# Patient Record
Sex: FEMALE | Race: WHITE | Employment: FULL TIME | URBAN - METROPOLITAN AREA
[De-identification: names, ages, dates, MRNs, and addresses within clinical notes are randomized per-mention and may not be internally consistent; named-entity substitution may affect disease eponyms.]

---

## 2018-11-14 ENCOUNTER — OFFICE VISIT (OUTPATIENT)
Dept: FAMILY MEDICINE | Facility: CLINIC | Age: 48
End: 2018-11-14
Payer: COMMERCIAL

## 2018-11-14 VITALS — HEART RATE: 80 BPM | DIASTOLIC BLOOD PRESSURE: 80 MMHG | SYSTOLIC BLOOD PRESSURE: 138 MMHG | OXYGEN SATURATION: 96 %

## 2018-11-14 DIAGNOSIS — L70.0 ACNE VULGARIS: Primary | ICD-10-CM

## 2018-11-14 DIAGNOSIS — D23.72 DERMATOFIBROMA OF LEFT LOWER LEG: ICD-10-CM

## 2018-11-14 DIAGNOSIS — L91.0 KELOID SCAR: ICD-10-CM

## 2018-11-14 PROCEDURE — 82565 ASSAY OF CREATININE: CPT | Performed by: FAMILY MEDICINE

## 2018-11-14 PROCEDURE — 84132 ASSAY OF SERUM POTASSIUM: CPT | Performed by: FAMILY MEDICINE

## 2018-11-14 PROCEDURE — 36415 COLL VENOUS BLD VENIPUNCTURE: CPT | Performed by: FAMILY MEDICINE

## 2018-11-14 PROCEDURE — 99214 OFFICE O/P EST MOD 30 MIN: CPT | Performed by: FAMILY MEDICINE

## 2018-11-14 RX ORDER — SPIRONOLACTONE 50 MG/1
50 TABLET, FILM COATED ORAL DAILY
Qty: 90 TABLET | Refills: 0 | Status: CANCELLED | OUTPATIENT
Start: 2018-11-14

## 2018-11-14 RX ORDER — DEXTROAMPHETAMINE SACCHARATE, AMPHETAMINE ASPARTATE MONOHYDRATE, DEXTROAMPHETAMINE SULFATE AND AMPHETAMINE SULFATE 7.5; 7.5; 7.5; 7.5 MG/1; MG/1; MG/1; MG/1
30 CAPSULE, EXTENDED RELEASE ORAL
COMMUNITY
Start: 2018-10-13

## 2018-11-14 RX ORDER — CITALOPRAM HYDROBROMIDE 20 MG/1
20 TABLET ORAL
COMMUNITY
Start: 2018-10-04 | End: 2020-08-03

## 2018-11-14 NOTE — PROGRESS NOTES
[facility] - PRIMARY CARE SKIN    CC : Lesion(s)  SUBJECTIVE:                                                    Lorraine Peoples is a 47 year old female who presents to clinic today because of acne and various spots on the legs.    Bothersome lesions noticed by the patient or other skin concerns :  Issue One : Bumps on legs  Bleeding : YES - bumps on legs when nicked while shaving  Issue Two : A bump on the back was removed but has changed in color.    Issue : Acne  Cystic acne and chin redness has been a chronic issue for years. Breakouts are noted on the chin. Spironolactone was previously discussed by an [facility] provider, but Lorraine reports that it was ruled out as an option due to previous lab results ?potassium. She is also concerned about potential medication reaction with Celexa.    Symptoms have been ongoing for : years.  The acne is primarily located on the : face.  Acne generally presents as : nodule(s) and pimple(s).    Previous treatments include : minocycline, intralesional triamcinolone.  She had tongue lesions and GI adverse effects with long-term minocycline, so she can no longer take it.    Refer to electronic medical record (EMR) for past medical history and medications.    INTEGUMENTARY/SKIN: POSITIVE for acne, lumps or bumps and pigmentation changes  ROS : 14 point review of systems was negative except the symptoms listed above in the HPI.    This document serves as a record of the services and decisions personally performed and made by Indira Bartlett MD. It was created on her behalf by Bharathi Yeh, a trained medical scribe.  The creation of this document is based on the scribe's personal observations and the provider's statements to the medical scribe.  Bharathi Yeh, November 14, 2018 4:08 PM      OBJECTIVE:                                                    GENERAL: healthy, alert and no distress  SKIN: Eldridge Skin Type - II.  Face, Neck, Trunk and Legs were examined. The dermatoscope was  used to help evaluate pigmented lesions.  Skin Pertinent Findings:  Left posterior shoulder : 6 mm in size keloid    Left posterior proximal lower leg : 3 mm in size raised, smooth, firm, flesh-colored lesion consistent with dermatofibroma    Face : nodules in various stages of resolution, residual post-inflammatory erythema    Diagnostic Test Results:  No results found for this or any previous visit (from the past 24 hour(s)).          ASSESSMENT:                                                      Encounter Diagnoses   Name Primary?     Acne vulgaris Yes     Keloid scar      Dermatofibroma of left lower leg        PLAN:                                                    Patient Instructions   FUTURE APPOINTMENTS  Please get potassium and creatinine labs done today.  Follow up in 3 month(s).    ORAL MEDICATION  Dr. Bartlett will call you with decision to start this medication after lab results return.  take by mouth 1 capsule(s)/tablet(s) of spironolactone 50 mg once daily.    Be aware of increased headaches, nausea, fatigue, or dizziness with starting spironolactone.      PROCEDURES:                                                    None.    TT : 25 minutes.  CT : 20 minutes.      The information in this document, created by the medical scribe for me, accurately reflects the services I personally performed and the decisions made by me. I have reviewed and approved this document for accuracy prior to leaving the patient care area.  November 14, 2018 4:08 PM  Lorna Bartlett MD  [facility] [place]

## 2018-11-14 NOTE — LETTER
11/14/2018         RE: Lorraine Peoples  [address] [facility] [address]        Dear Colleague,    Thank you for referring your patient, Lorraine Peoples, to the [facility] [place]. Please see a copy of my visit note below.    [facility] - PRIMARY CARE SKIN    CC : Lesion(s)  SUBJECTIVE:                                                    Lorraine Peoples is a 47 year old female who presents to clinic today because of acne and various spots on the legs.    Bothersome lesions noticed by the patient or other skin concerns :  Issue One : Bumps on legs  Bleeding : YES - bumps on legs when nicked while shaving  Issue Two : A bump on the back was removed but has changed in color.    Issue : Acne  Cystic acne and chin redness has been a chronic issue for years. Breakouts are noted on the chin. Spironolactone was previously discussed by an [facility] provider, but Lorraine reports that it was ruled out as an option due to previous lab results ?potassium. She is also concerned about potential medication reaction with Celexa.    Symptoms have been ongoing for : years.  The acne is primarily located on the : face.  Acne generally presents as : nodule(s) and pimple(s).    Previous treatments include : minocycline, intralesional triamcinolone.  She had tongue lesions and GI adverse effects with long-term minocycline, so she can no longer take it.    Refer to electronic medical record (EMR) for past medical history and medications.    INTEGUMENTARY/SKIN: POSITIVE for acne, lumps or bumps and pigmentation changes  ROS : 14 point review of systems was negative except the symptoms listed above in the HPI.    This document serves as a record of the services and decisions personally performed and made by Indira Bartlett MD. It was created on her behalf by Bharathi Yeh, a trained medical scribe.  The creation of this document is based on the scribe's personal observations and the provider's statements to the medical  amanda.  Bharathi Yeh, November 14, 2018 4:08 PM      OBJECTIVE:                                                    GENERAL: healthy, alert and no distress  SKIN: Eldridge Skin Type - II.  Face, Neck, Trunk and Legs were examined. The dermatoscope was used to help evaluate pigmented lesions.  Skin Pertinent Findings:  Left posterior shoulder : 6 mm in size keloid    Left posterior proximal lower leg : 3 mm in size raised, smooth, firm, flesh-colored lesion consistent with dermatofibroma    Face : nodules in various stages of resolution, residual post-inflammatory erythema    Diagnostic Test Results:  No results found for this or any previous visit (from the past 24 hour(s)).          ASSESSMENT:                                                      Encounter Diagnoses   Name Primary?     Acne vulgaris Yes     Keloid scar      Dermatofibroma of left lower leg        PLAN:                                                    Patient Instructions   FUTURE APPOINTMENTS  Please get potassium and creatinine labs done today.  Follow up in 3 month(s).    ORAL MEDICATION  Dr. Bartlett will call you with decision to start this medication after lab results return.  take by mouth 1 capsule(s)/tablet(s) of spironolactone 50 mg once daily.    Be aware of increased headaches, nausea, fatigue, or dizziness with starting spironolactone.      PROCEDURES:                                                    None.    TT : 25 minutes.  CT : 20 minutes.      The information in this document, created by the medical scribe for me, accurately reflects the services I personally performed and the decisions made by me. I have reviewed and approved this document for accuracy prior to leaving the patient care area.  November 14, 2018 4:08 PM  Lorna Bartlett MD  [facility] [place]    Again, thank you for allowing me to participate in the care of your patient.        Sincerely,        Lorna Bartlett MD

## 2018-11-14 NOTE — MR AVS SNAPSHOT
After Visit Summary   11/14/2018    Lorraine Peoples    MRN: [id number]           Patient Information     Date Of Birth          1970        Visit Information        Provider Department      11/14/2018 4:00 PM Lorna Bartlett MD [facility] [place]        Today's Diagnoses     Acne vulgaris    -  1    Keloid scar        Dermatofibroma of left lower leg          Care Instructions    FUTURE APPOINTMENTS  Please get potassium and creatinine labs done today.  Follow up in 3 month(s).    ORAL MEDICATION  Dr. Bartlett will call you with decision to start this medication after lab results return.  take by mouth 1 capsule(s)/tablet(s) of spironolactone 50 mg once daily.    Be aware of increased headaches, nausea, fatigue, or dizziness with starting spironolactone.          Follow-ups after your visit        Who to contact     If you have questions or need follow up information about today's clinic visit or your schedule please contact [facility] directly at [phone].  Normal or non-critical lab and imaging results will be communicated to you by MyChart, letter or phone within 4 business days after the clinic has received the results. If you do not hear from us within 7 days, please contact the clinic through [facility] or phone. If you have a critical or abnormal lab result, we will notify you by phone as soon as possible.  Submit refill requests through [email or web address] or call your pharmacy and they will forward the refill request to us. Please allow 3 business days for your refill to be completed.          Additional Information About Your Visit        Care EveryWhere ID     This is your Care EveryWhere ID. This could be used by other organizations to access your [facility] medical records  [id number]        Your Vitals Were     Pulse Pulse Oximetry                80 96%           Blood Pressure from Last 3 Encounters:   11/14/18 138/80   11/01/01 102/76   10/23/01  120/64    Weight from Last 3 Encounters:   11/01/01 165 lb (74.8 kg)   10/23/01 163 lb (73.9 kg)   08/28/01 162 lb (73.5 kg)              Today, you had the following     No orders found for display       Primary Care Provider Office Phone # Fax #    Sasha Darnell [phone]       [facility] [address] [facility] [address]        Equal Access to Services     RC RUSSELL : Hadii aad ku hadasho Soomaali, waaxda luqadaha, qaybta kaalmada adeegyada, waxay idiin hayaan adeeg kharash la'abundion . So [facility] [phone].    ATENCIÓN: Si habla español, tiene a briggs disposición servicios gratuitos de asistencia lingüística. [facility] [phone].    We comply with applicable federal civil rights laws and [place] laws. We do not discriminate on the basis of race, color, national origin, age, disability, sex, sexual orientation, or gender identity.            Thank you!     Thank you for choosing [facility]  for your care. Our goal is always to provide you with excellent care. Hearing back from our patients is one way we can continue to improve our services. Please take a few minutes to complete the written survey that you may receive in the mail after your visit with us. Thank you!             Your Updated Medication List - Protect others around you: Learn how to safely use, store and throw away your medicines at www.disposemymeds.org.          This list is accurate as of 11/14/18  4:31 PM.  Always use your most recent med list.                   Brand Name Dispense Instructions for use Diagnosis    amphetamine-dextroamphetamine 30 MG per 24 hr capsule    ADDERALL XR     Take 30 mg by mouth        citalopram 20 MG tablet    celeXA     Take 20 mg by mouth

## 2018-11-14 NOTE — PATIENT INSTRUCTIONS
FUTURE APPOINTMENTS  Please get potassium and creatinine labs done today.  Follow up in 3 month(s).    ORAL MEDICATION  Dr. Bartlett will call you with decision to start this medication after lab results return.  take by mouth 1 capsule(s)/tablet(s) of spironolactone 50 mg once daily.    Be aware of increased headaches, nausea, fatigue, or dizziness with starting spironolactone.

## 2018-11-15 LAB
CREAT SERPL-MCNC: 0.61 MG/DL (ref 0.52–1.04)
GFR SERPL CREATININE-BSD FRML MDRD: >90 ML/MIN/1.7M2
POTASSIUM SERPL-SCNC: 4.2 MMOL/L (ref 3.4–5.3)

## 2018-11-21 ENCOUNTER — TELEPHONE (OUTPATIENT)
Dept: NURSING | Facility: CLINIC | Age: 48
End: 2018-11-21

## 2018-11-21 ENCOUNTER — NURSE TRIAGE (OUTPATIENT)
Dept: NURSING | Facility: CLINIC | Age: 48
End: 2018-11-21

## 2018-11-21 DIAGNOSIS — L70.0 ACNE VULGARIS: Primary | ICD-10-CM

## 2018-11-21 NOTE — TELEPHONE ENCOUNTER
"She was seen in [facility] and is very upset that nobody has called her back with lab results and decision about sprionolactone. She said it is \"very rude\" not to call her back. I apologized and offered to leave a message, but advised her that the [facility] clinic are all closing at 5 today, and some may have rolled their phones over early.  Anna Lam RN  [facility]    "

## 2018-11-26 RX ORDER — SPIRONOLACTONE 50 MG/1
50 TABLET, FILM COATED ORAL DAILY
Qty: 90 TABLET | Refills: 1 | Status: SHIPPED | OUTPATIENT
Start: 2018-11-26 | End: 2019-05-02

## 2019-01-19 ENCOUNTER — APPOINTMENT (OUTPATIENT)
Dept: ULTRASOUND IMAGING | Facility: CLINIC | Age: 49
End: 2019-01-19
Payer: COMMERCIAL

## 2019-01-19 ENCOUNTER — HOSPITAL ENCOUNTER (EMERGENCY)
Facility: CLINIC | Age: 49
Discharge: HOME | End: 2019-01-19
Attending: EMERGENCY MEDICINE | Admitting: EMERGENCY MEDICINE
Payer: COMMERCIAL

## 2019-01-19 VITALS
BODY MASS INDEX: 35.44 KG/M2 | RESPIRATION RATE: 20 BRPM | SYSTOLIC BLOOD PRESSURE: 138 MMHG | TEMPERATURE: 98 F | DIASTOLIC BLOOD PRESSURE: 95 MMHG | OXYGEN SATURATION: 97 % | HEIGHT: 63 IN | WEIGHT: 200 LBS | HEART RATE: 73 BPM

## 2019-01-19 DIAGNOSIS — M79.604 PAIN OF RIGHT LOWER EXTREMITY: ICD-10-CM

## 2019-01-19 LAB
ANION GAP SERPL CALCULATED.3IONS-SCNC: 6 MMOL/L (ref 3–14)
B-HCG FREE SERPL-ACNC: <5 IU/L
BASOPHILS # BLD AUTO: 0 10E9/L (ref 0–0.2)
BASOPHILS NFR BLD AUTO: 0.8 %
BUN SERPL-MCNC: 10 MG/DL (ref 7–30)
CALCIUM SERPL-MCNC: 8.5 MG/DL (ref 8.5–10.1)
CHLORIDE SERPL-SCNC: 108 MMOL/L (ref 94–109)
CO2 SERPL-SCNC: 26 MMOL/L (ref 20–32)
CREAT SERPL-MCNC: 0.65 MG/DL (ref 0.52–1.04)
DIFFERENTIAL METHOD BLD: NORMAL
EOSINOPHIL # BLD AUTO: 0.3 10E9/L (ref 0–0.7)
EOSINOPHIL NFR BLD AUTO: 5.6 %
ERYTHROCYTE [DISTWIDTH] IN BLOOD BY AUTOMATED COUNT: 13.2 % (ref 10–15)
GFR SERPL CREATININE-BSD FRML MDRD: >90 ML/MIN/{1.73_M2}
GLUCOSE SERPL-MCNC: 86 MG/DL (ref 70–99)
HCT VFR BLD AUTO: 42.2 % (ref 35–47)
HGB BLD-MCNC: 14.6 G/DL (ref 11.7–15.7)
IMM GRANULOCYTES # BLD: 0 10E9/L (ref 0–0.4)
IMM GRANULOCYTES NFR BLD: 0.2 %
LYMPHOCYTES # BLD AUTO: 1.4 10E9/L (ref 0.8–5.3)
LYMPHOCYTES NFR BLD AUTO: 26.4 %
MCH RBC QN AUTO: 31.7 PG (ref 26.5–33)
MCHC RBC AUTO-ENTMCNC: 34.6 G/DL (ref 31.5–36.5)
MCV RBC AUTO: 92 FL (ref 78–100)
MONOCYTES # BLD AUTO: 0.5 10E9/L (ref 0–1.3)
MONOCYTES NFR BLD AUTO: 9.6 %
NEUTROPHILS # BLD AUTO: 3 10E9/L (ref 1.6–8.3)
NEUTROPHILS NFR BLD AUTO: 57.4 %
NRBC # BLD AUTO: 0 10*3/UL
NRBC BLD AUTO-RTO: 0 /100
PLATELET # BLD AUTO: 234 10E9/L (ref 150–450)
POTASSIUM SERPL-SCNC: 3.8 MMOL/L (ref 3.4–5.3)
RBC # BLD AUTO: 4.61 10E12/L (ref 3.8–5.2)
SODIUM SERPL-SCNC: 140 MMOL/L (ref 133–144)
WBC # BLD AUTO: 5.2 10E9/L (ref 4–11)

## 2019-01-19 PROCEDURE — 80048 BASIC METABOLIC PNL TOTAL CA: CPT | Performed by: EMERGENCY MEDICINE

## 2019-01-19 PROCEDURE — 93971 EXTREMITY STUDY: CPT | Mod: RT

## 2019-01-19 PROCEDURE — 99284 EMERGENCY DEPT VISIT MOD MDM: CPT | Mod: 25

## 2019-01-19 PROCEDURE — 85025 COMPLETE CBC W/AUTO DIFF WBC: CPT | Performed by: EMERGENCY MEDICINE

## 2019-01-19 PROCEDURE — 84702 CHORIONIC GONADOTROPIN TEST: CPT

## 2019-01-19 RX ORDER — IBUPROFEN 600 MG/1
600 TABLET, FILM COATED ORAL ONCE
Status: DISCONTINUED | OUTPATIENT
Start: 2019-01-19 | End: 2019-01-19 | Stop reason: HOSPADM

## 2019-01-19 RX ORDER — IBUPROFEN 600 MG/1
600 TABLET, FILM COATED ORAL EVERY 6 HOURS PRN
Qty: 25 TABLET | Refills: 0 | Status: SHIPPED | OUTPATIENT
Start: 2019-01-19 | End: 2019-01-27

## 2019-01-19 RX ORDER — ACETAMINOPHEN 325 MG/1
650 TABLET ORAL EVERY 6 HOURS PRN
Qty: 30 TABLET | Refills: 0 | Status: SHIPPED | OUTPATIENT
Start: 2019-01-19 | End: 2019-01-27

## 2019-01-19 ASSESSMENT — ENCOUNTER SYMPTOMS
MYALGIAS: 1
VOMITING: 0
NAUSEA: 0
BACK PAIN: 0
NUMBNESS: 1

## 2019-01-19 ASSESSMENT — MIFFLIN-ST. JEOR: SCORE: 1516.32

## 2019-01-19 NOTE — ED TRIAGE NOTES
Pt has sharp pain in right thigh which started during the night.  Pain now radiates into groin and down to ankle.

## 2019-01-19 NOTE — ED PROVIDER NOTES
"  History     Chief Complaint:  Right Leg Pain    HPI   Lorraine Peoples is a 48 year old female who presents with right leg pain. The patient reports that last night while she was sleeping, at 0300 she noticed a pain in her mid right thigh that radiated to her knee. She reports that the pain felt as though she pulled a muscle. She then took at a tylenol at 0800 and fell back asleep with relief of the pain. The patient woke back up at 1100 and noticed that the pain in her thigh had returned. She reports the only way to alleviate her pain is to lay on her right leg, applying pressure. Her right foot has been intermittently numb though denies currently. The patient denies any back pain, chest pain, focal weakness, fevers. No history of blood clots/trauma. No history of anticoagulation.     Allergies:  Minocycline  Penicillins  Wellbutrin [Bupropion]    Medications:    Adderall  Celexa  Aldactone    Past Medical History:    Chronic Maxillary Sinusitis  Allergic Rhinitis  Abnormal Papanicolaou smear of cervix and cervical HPV    Past Surgical History:    Sinus Surgery- submucosal resection of inf turbinates  Tonsils Removed    Family History:    Mother: Allergies  Brother: Allergies    Social History:  Smoking Status: Never Smoker  Alcohol Use: Yes  Patient presents with   Marital Status:        Review of Systems   Cardiovascular: Negative for chest pain.   Gastrointestinal: Negative for nausea and vomiting.   Musculoskeletal: Positive for myalgias. Negative for back pain.   Neurological: Positive for numbness (none currently).   All other systems reviewed and are negative.      Physical Exam   First Vitals:  BP: (!) 161/108  Pulse: 83  Heart Rate: 88  Temp: 98  F (36.7  C)  Resp: 20  Height: 160 cm (5' 3\")  Weight: 90.7 kg (200 lb)  SpO2: 98 %      Physical Exam  Nursing note and vitals reviewed.  Constitutional: Well nourished. Resting comfortably.   Eyes: Conjunctiva normal.  Pupils are equal, round, " and reactive to light.   ENT: Nose normal. Mucous membranes pink and moist.    Neck: Normal range of motion.  CVS: Normal rate, regular rhythm.  Normal heart sounds.  No murmur. 2/2 DP pulses bilaterally  Pulmonary: Lungs clear to auscultation bilaterally. No wheezes/rales/rhonchi.  GI: Abdomen soft. Nontender, nondistended. No rigidity or guarding.    MSK: No calf tenderness or swelling. R. Upper anterior thigh with palpable tenderness, mild soft tissue swelling though no warmth, erythema, fluctuance or induration. Full ROM R. Hip/R. Knee without bony tenderness.  No c/t/l bony tenderness.   Neuro: Alert. Follows simple commands. Moves all extremities equally and symmetrically. Sensation intact L2-S1.  Gait normal.   Skin: Skin is warm and dry. No rash noted.   Psychiatric: Normal affect.       Emergency Department Course     Imaging:  Radiographic findings were communicated with the patient who voiced understanding of the findings.  US Lower Extremity Venous Duplex Right  No DVT Demonstrated.   As read by Radiology.    Laboratory:  ISTAT HCG Quantitative Pregnancy: <5.0  CBC: WNL (WBC 5.2, HGB 14.6, )  BMP: WNL (Creatinine 0.65)    Interventions:  1552 Advil 600 mg oral     Emergency Department Course:  Past medical records, nursing notes, and vitals reviewed.  1510: I performed an exam of the patient and obtained history, as documented above.    The patient was sent for a ultrasound while in the emergency department, findings above.    Labs were performed.     Medication was given.     1700: I rechecked the patient. Explained findings to patient. The patient is comfortable and agrees the plan of discharge.    1712: I rechecked the patient. Findings and plan explained to the Patient. Patient discharged home with instructions regarding supportive care, medications, and reasons to return. The importance of close follow-up was reviewed.     Impression & Plan      Medical Decision Making:  Lorraine Peoples is a  48 year old female presenting with right leg pain. She is neurovascularly intact on arrival with soft compartments. There is no reported trauma. Duplex ultrasound without evidence of DVT. I did offer patient formal xrays although I had a lower suspicion for fracture given no reported trauma.  Unknown etiology of presentation though suspect more MSK pain at this time. I did recommend tylenol or motrin for analgesia. I did [place] patient that should her symptoms persist she might benefit from formal repeat ultrasound in 7 days to assess for DVT. Planning primary care physician follow up in 2-3 days for reevaluation. Instructed to return to ED for fever, increasing pain, or should symptoms worsen or change.      Diagnosis:    ICD-10-CM    1. Pain of right lower extremity M79.604        Disposition:  discharged to home    Discharge Medications:     Medication List      Started    acetaminophen 325 MG tablet  Commonly known as:  TYLENOL  650 mg, Oral, EVERY 6 HOURS PRN     ibuprofen 600 MG tablet  Commonly known as:  ADVIL/MOTRIN  600 mg, Oral, EVERY 6 HOURS PRN            Shara Shearer  1/19/2019   [facility] EMERGENCY DEPARTMENT  I, Shara Shearer, am serving as a scribe at 3:10 PM on 1/19/2019 to document services personally performed by Patt Rivera DO based on my observations and the provider's statements to me.            Patt Rivera DO  01/19/19 1901

## 2019-01-19 NOTE — ED AVS SNAPSHOT
[facility] Emergency Department  [address]  [facility] [address]  Phone:  [phone]  Fax:  [phone]                                    Lorraine Peoples   MRN: [id number]    Department:  [facility] Emergency Department   Date of Visit:  1/19/2019           After Visit Summary Signature Page    I have received my discharge instructions, and my questions have been answered. I have discussed any challenges I see with this plan with the nurse or doctor.    ..........................................................................................................................................  Patient/Patient Representative Signature      ..........................................................................................................................................  Patient Representative Print Name and Relationship to Patient    ..................................................               ................................................  Date                                   Time    ..........................................................................................................................................  Reviewed by Signature/Title    ...................................................              ..............................................  Date                                               Time          22EPIC Rev 08/18

## 2019-01-20 ENCOUNTER — HOSPITAL ENCOUNTER (EMERGENCY)
Facility: CLINIC | Age: 49
Discharge: HOME | End: 2019-01-20
Attending: EMERGENCY MEDICINE | Admitting: EMERGENCY MEDICINE
Payer: COMMERCIAL

## 2019-01-20 ENCOUNTER — APPOINTMENT (OUTPATIENT)
Dept: MRI IMAGING | Facility: CLINIC | Age: 49
End: 2019-01-20
Payer: COMMERCIAL

## 2019-01-20 VITALS
OXYGEN SATURATION: 98 % | SYSTOLIC BLOOD PRESSURE: 149 MMHG | TEMPERATURE: 98.1 F | DIASTOLIC BLOOD PRESSURE: 100 MMHG | HEART RATE: 73 BPM | RESPIRATION RATE: 16 BRPM

## 2019-01-20 DIAGNOSIS — M79.651 PAIN OF RIGHT THIGH: ICD-10-CM

## 2019-01-20 LAB — CK SERPL-CCNC: 82 U/L (ref 30–225)

## 2019-01-20 PROCEDURE — 99285 EMERGENCY DEPT VISIT HI MDM: CPT | Mod: 25

## 2019-01-20 PROCEDURE — 72148 MRI LUMBAR SPINE W/O DYE: CPT

## 2019-01-20 PROCEDURE — 25000131 ZZH RX MED GY IP 250 OP 636 PS 637: Performed by: EMERGENCY MEDICINE

## 2019-01-20 PROCEDURE — 82550 ASSAY OF CK (CPK): CPT | Performed by: EMERGENCY MEDICINE

## 2019-01-20 PROCEDURE — 25000128 H RX IP 250 OP 636: Performed by: EMERGENCY MEDICINE

## 2019-01-20 PROCEDURE — 96374 THER/PROPH/DIAG INJ IV PUSH: CPT

## 2019-01-20 RX ORDER — HYDROMORPHONE HYDROCHLORIDE 1 MG/ML
0.5 INJECTION, SOLUTION INTRAMUSCULAR; INTRAVENOUS; SUBCUTANEOUS
Status: DISCONTINUED | OUTPATIENT
Start: 2019-01-20 | End: 2019-01-20

## 2019-01-20 RX ORDER — MORPHINE SULFATE 2 MG/ML
2 INJECTION, SOLUTION INTRAMUSCULAR; INTRAVENOUS ONCE
Status: COMPLETED | OUTPATIENT
Start: 2019-01-20 | End: 2019-01-20

## 2019-01-20 RX ORDER — TRAMADOL HYDROCHLORIDE 50 MG/1
50 TABLET ORAL EVERY 6 HOURS PRN
Qty: 8 TABLET | Refills: 0 | Status: SHIPPED | OUTPATIENT
Start: 2019-01-20 | End: 2019-01-23

## 2019-01-20 RX ADMIN — DEXAMETHASONE 10 MG: 2 TABLET ORAL at 18:49

## 2019-01-20 RX ADMIN — MORPHINE SULFATE 2 MG: 2 INJECTION, SOLUTION INTRAMUSCULAR; INTRAVENOUS at 18:50

## 2019-01-20 ASSESSMENT — ENCOUNTER SYMPTOMS
BACK PAIN: 1
ARTHRALGIAS: 1

## 2019-01-20 NOTE — ED AVS SNAPSHOT
[facility] Emergency Department  [address]  [facility] [address]  Phone:  [phone]  Fax:  [phone]                                    Lorraine Peoples   MRN: [id number]    Department:  [facility] Emergency Department   Date of Visit:  1/20/2019           After Visit Summary Signature Page    I have received my discharge instructions, and my questions have been answered. I have discussed any challenges I see with this plan with the nurse or doctor.    ..........................................................................................................................................  Patient/Patient Representative Signature      ..........................................................................................................................................  Patient Representative Print Name and Relationship to Patient    ..................................................               ................................................  Date                                   Time    ..........................................................................................................................................  Reviewed by Signature/Title    ...................................................              ..............................................  Date                                               Time          22EPIC Rev 08/18

## 2019-01-20 NOTE — ED TRIAGE NOTES
Patient presents to the ED reporting low back pain that radiates down right leg. Seen yesterday for same symptoms. Has had continued pain. Patient spoke with family member today who told patient she must have dermatomes, so patient is requesting MRI.

## 2019-01-21 NOTE — DISCHARGE INSTRUCTIONS
ALTERNATE IBUPROFEN AND ACETAMINOPHEN EVERY THREE HOURS TO KEEP FEVER DOWN AND MAXIMIZE PAIN CONTROL.    FOR INSTANCE:  If you give ibuprofen at 2pm, give acetaminophen at 5pm, then ibuprofen at 8pm again.   You can take up to 1,000 mg of tylenol every 6 hours and I do not recommend taking more than 600mg of ibuprofen every 6 hours.   You can use the tramadol as needed thereafter for pain not improved with the above therapy you should continue to use warm compress for comfort and to follow-up with your primary care doctor for recheck.  Should return the emergency department for worsening pain, develop fever or weakness in your leg.

## 2019-01-21 NOTE — ED PROVIDER NOTES
History     Chief Complaint:  Leg and back pain     HPI   Lorraine Peoples is a 48 year old female who was evaluated yesterday but returns today for the same pain in her right extremity. The patient states that the pain seems to radiate across her knee and into her upper thigh, as well as below the knee into her calf muscles. She states that she is now both weak and intermittently numb in the leg. She and her  are concerned that she might have a slipped disc and have consulted with several members of their family I the medical field about this issue and have now expressed interest in MRI investigation of her lumbar spine.  The patient denies any traumatic involvement of the back. She states that onset of pain 2 nights ago at 0300. The pain woke her up from sleep and she has been in excruciating pain since. She states that the intensity of the pain is worse than her previous kidney stones.     Allergies:  Minocycline  Penicillins  Wellbutrin [Bupropion]     Medications:    Acetaminophen (Tylenol) 325 Mg Tablet  Amphetamine-Dextroamphetamine (Adderall Xr) 30 Mg Per 24 Hr Capsule  Citalopram (Celexa) 20 Mg Tablet  Ibuprofen (Advil/Motrin) 600 Mg Tablet  Spironolactone (Aldactone) 50 Mg Tablet     Past Medical History:    SI joint  Gastroenteritis.   Kidney stones.   Maxillary sinusitis  Allergic rhinitis.     Past Surgical History:    Sinus surgery-submucosal resection of inf turbinates, meatal antrostomy  Tonsillectomy  adenoidectomy    Family History:    Allergies  Heart disease    Social History:  The patient  reports that  has never smoked. she has never used smokeless tobacco. She reports that she drinks alcohol. She reports that she does not use drugs.   Marital Status:   [2]    Review of Systems   Musculoskeletal: Positive for arthralgias and back pain.   All other systems reviewed and are negative.      Physical Exam     Vital signs  Patient Vitals for the past 24 hrs:   BP Temp Pulse Resp SpO2    01/20/19 2140 -- -- -- 16 --   01/20/19 2135 (!) 149/100 -- 73 -- --   01/20/19 2125 -- -- -- -- 98 %   01/20/19 2105 -- -- -- -- 99 %   01/20/19 1945 -- -- -- -- 97 %   01/20/19 1930 -- -- -- -- 97 %   01/20/19 1855 -- -- -- -- 99 %   01/20/19 1742 (!) 142/108 98.1  F (36.7  C) 105 18 100 %          Physical Exam  Constitutional: Alert, attentive, GCS 15  HENT:    Nose: Nose normal.    Mouth/Throat: Oropharynx is clear, mucous membranes are moist  Eyes: EOM are normal, anicteric, conjugate gaze  CV: regular rate and rhythm; no murmurs  Chest: Effort normal and breath sounds clear without wheezing or rales, symmetric bilaterally   GI:  non tender. No distension. No guarding or rebound.    MSK: No LE edema, no tenderness to palpation of BLE. Mid upper leg anteriorly extending around laterally. Pain with passive extension and flexion of the knee.   Neurological: Alert, attentive, moving all extremities equally.  5/5 Strength in the leg bilaterally. Sensation in tact.   Skin: Skin is warm and dry.  Emergency Department Course   Imaging:  MR Lumbar Spine w/o Contrast   Final Result   IMPRESSION: Disc protrusion at L5-S1. No evidence of nerve root   displacement. No significant spinal canal or foraminal stenosis.      NONA FU MD        Results as read by radiology.   I communicated the results of the imaging studies with the patient and her spouse who expressed understanding of these findings.        Laboratory:  CK 82    Interventions:  1849: Decadron 10mg PO  1850: Morphine 2mg IV       Emergency Department Course:  Past medical records, nursing notes, and vitals reviewed.  1814: I performed an exam of the patient and obtained history, as documented above.       The patient was sent for a MR Lumbar spine while in the emergency department, findings above.    The patient felt improved after the above interventions.     2130: I rechecked the patient.  Findings and plan explained to the Patient and spouse.  Patient discharged home with instructions regarding supportive care, medications, and reasons to return. The importance of close follow-up was reviewed.    Impression & Plan    Medical Decision Makin-year-old female with past medical history significant for chronic pain, generalized anxiety disorder, hypothyroidism presenting for evaluation of right leg pain for which she was seen the day prior and had a negative DVT ultrasound at that time and basic labs, diagnosed with musculoskeletal pain.  She presents today with increased pain and concerns that she has a pinched nerve after talking to her family member who is a general provider and her  is an orthopedic surgeon and they were concerned about radicular symptoms.  On exam, she describes more of a circumferential pain in her anterior upper leg and lateral thigh that extends to the knee but not below this is in the setting of pain beginning well sleeping 2 nights prior without clear injury.  On exam she does have significant anterior muscle tenderness not typical of radicular symptoms as her passive range of motion does not elicit any and sitting pain.  I did check a CK which was negative suggestive against myositis and she does not have any infectious symptoms and is not on a statin.  I did opt to get the MRI given the degree of her pain and her history of pain issues, this showed a small protruding disc at the L5-S1 disc space without nerve compression not suggestive of her symptom etiology.  After discussion with the patient I reassured her that she does not have disc disease causing her symptoms today and that she likely has a muscle strain or contusion given the tenderness.  I recommended warm compress, Tylenol, ibuprofen and she was given a short course of tramadol.  I recommended she follow-up with her primary care doctor for recheck.      Diagnosis:    ICD-10-CM    1. Pain of right thigh M79.651        Disposition:  discharged to  home    Discharge Medications:     Medication List      Started    traMADol 50 MG tablet  Commonly known as:  ULTRAM  50 mg, Oral, EVERY 6 HOURS PRN            Emmanuel Forrester MD   [employer]  1:31 PM 01/21/19     Sloan CHOI, vik serving as a scribe at 9:51 PM on 1/20/2019 to document services personally performed by Dr. Emmanuel Forrester based on my observations and the provider's statements to me.    [facility] EMERGENCY DEPARTMENT       Emmanuel Forrester MD  01/21/19 0741

## 2019-01-27 ENCOUNTER — HOSPITAL ENCOUNTER (EMERGENCY)
Facility: CLINIC | Age: 49
Discharge: HOME | End: 2019-01-28
Attending: EMERGENCY MEDICINE | Admitting: EMERGENCY MEDICINE
Payer: COMMERCIAL

## 2019-01-27 DIAGNOSIS — M25.551 HIP PAIN, RIGHT: ICD-10-CM

## 2019-01-27 DIAGNOSIS — F10.920 ALCOHOLIC INTOXICATION WITHOUT COMPLICATION (H): ICD-10-CM

## 2019-01-27 PROCEDURE — 99283 EMERGENCY DEPT VISIT LOW MDM: CPT

## 2019-01-27 RX ORDER — GABAPENTIN 300 MG/1
300 CAPSULE ORAL
COMMUNITY
Start: 2019-01-25 | End: 2020-08-03

## 2019-01-27 RX ORDER — HYDROCODONE BITARTRATE AND ACETAMINOPHEN 5; 325 MG/1; MG/1
1-2 TABLET ORAL
COMMUNITY
Start: 2019-01-25 | End: 2019-01-28

## 2019-01-27 NOTE — ED AVS SNAPSHOT
[facility] Emergency Department  [address]  [facility] [address]  Phone:  [phone]  Fax:  [phone]                                    Lorraine Peoples   MRN: [id number]    Department:  [facility] Emergency Department   Date of Visit:  1/27/2019           After Visit Summary Signature Page    I have received my discharge instructions, and my questions have been answered. I have discussed any challenges I see with this plan with the nurse or doctor.    ..........................................................................................................................................  Patient/Patient Representative Signature      ..........................................................................................................................................  Patient Representative Print Name and Relationship to Patient    ..................................................               ................................................  Date                                   Time    ..........................................................................................................................................  Reviewed by Signature/Title    ...................................................              ..............................................  Date                                               Time          22EPIC Rev 08/18

## 2019-01-28 VITALS
SYSTOLIC BLOOD PRESSURE: 129 MMHG | HEART RATE: 98 BPM | OXYGEN SATURATION: 100 % | WEIGHT: 200 LBS | DIASTOLIC BLOOD PRESSURE: 85 MMHG | TEMPERATURE: 98.1 F | RESPIRATION RATE: 20 BRPM | BODY MASS INDEX: 35.43 KG/M2

## 2019-01-28 LAB
AMPHETAMINES UR QL SCN: NEGATIVE
ANION GAP SERPL CALCULATED.3IONS-SCNC: 10 MMOL/L (ref 3–14)
BARBITURATES UR QL: NEGATIVE
BENZODIAZ UR QL: POSITIVE
BUN SERPL-MCNC: 12 MG/DL (ref 7–30)
CALCIUM SERPL-MCNC: 8.5 MG/DL (ref 8.5–10.1)
CANNABINOIDS UR QL SCN: NEGATIVE
CHLORIDE SERPL-SCNC: 109 MMOL/L (ref 94–109)
CO2 SERPL-SCNC: 23 MMOL/L (ref 20–32)
COCAINE UR QL: NEGATIVE
CREAT SERPL-MCNC: 0.61 MG/DL (ref 0.52–1.04)
ERYTHROCYTE [DISTWIDTH] IN BLOOD BY AUTOMATED COUNT: 13.2 % (ref 10–15)
ETHANOL SERPL-MCNC: 0.18 G/DL
GFR SERPL CREATININE-BSD FRML MDRD: >90 ML/MIN/{1.73_M2}
GLUCOSE SERPL-MCNC: 86 MG/DL (ref 70–99)
HCT VFR BLD AUTO: 45.2 % (ref 35–47)
HGB BLD-MCNC: 15.4 G/DL (ref 11.7–15.7)
MCH RBC QN AUTO: 31.7 PG (ref 26.5–33)
MCHC RBC AUTO-ENTMCNC: 34.1 G/DL (ref 31.5–36.5)
MCV RBC AUTO: 93 FL (ref 78–100)
OPIATES UR QL SCN: POSITIVE
PCP UR QL SCN: NEGATIVE
PLATELET # BLD AUTO: 310 10E9/L (ref 150–450)
POTASSIUM SERPL-SCNC: 4.1 MMOL/L (ref 3.4–5.3)
RBC # BLD AUTO: 4.86 10E12/L (ref 3.8–5.2)
SODIUM SERPL-SCNC: 142 MMOL/L (ref 133–144)
WBC # BLD AUTO: 7.3 10E9/L (ref 4–11)

## 2019-01-28 PROCEDURE — 80320 DRUG SCREEN QUANTALCOHOLS: CPT | Performed by: EMERGENCY MEDICINE

## 2019-01-28 PROCEDURE — 85027 COMPLETE CBC AUTOMATED: CPT | Performed by: EMERGENCY MEDICINE

## 2019-01-28 PROCEDURE — 80048 BASIC METABOLIC PNL TOTAL CA: CPT | Performed by: EMERGENCY MEDICINE

## 2019-01-28 PROCEDURE — 80307 DRUG TEST PRSMV CHEM ANLYZR: CPT | Performed by: EMERGENCY MEDICINE

## 2019-01-28 PROCEDURE — 25000132 ZZH RX MED GY IP 250 OP 250 PS 637: Performed by: EMERGENCY MEDICINE

## 2019-01-28 RX ORDER — HYDROCODONE BITARTRATE AND ACETAMINOPHEN 5; 325 MG/1; MG/1
1 TABLET ORAL ONCE
Status: COMPLETED | OUTPATIENT
Start: 2019-01-28 | End: 2019-01-28

## 2019-01-28 RX ORDER — IBUPROFEN 800 MG/1
800 TABLET, FILM COATED ORAL ONCE
Status: COMPLETED | OUTPATIENT
Start: 2019-01-28 | End: 2019-01-28

## 2019-01-28 RX ORDER — LIDOCAINE 4 G/G
1 PATCH TOPICAL ONCE
Status: DISCONTINUED | OUTPATIENT
Start: 2019-01-28 | End: 2019-01-28 | Stop reason: HOSPADM

## 2019-01-28 RX ADMIN — IBUPROFEN 800 MG: 800 TABLET ORAL at 03:05

## 2019-01-28 RX ADMIN — HYDROCODONE BITARTRATE AND ACETAMINOPHEN 1 TABLET: 5; 325 TABLET ORAL at 04:41

## 2019-01-28 RX ADMIN — HYDROCODONE BITARTRATE AND ACETAMINOPHEN 1 TABLET: 5; 325 TABLET ORAL at 04:46

## 2019-01-28 RX ADMIN — LIDOCAINE 1 PATCH: 560 PATCH PERCUTANEOUS; TOPICAL; TRANSDERMAL at 03:05

## 2019-01-28 ASSESSMENT — ENCOUNTER SYMPTOMS
HALLUCINATIONS: 0
ARTHRALGIAS: 1
FEVER: 0
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
## 2019-01-28 NOTE — ED NOTES
Now pts  requesting to speak to RN. Pts  asking what is going on. While this RN was explaining the policies and procedures in place at [facility],

## 2019-01-28 NOTE — ED NOTES
Asked pt exactly where she would like the lidocaine patch, pt chose R lateral knee area, she says pain is constantly radiating from hip to thigh to knee and back. Of note, pt does appear extremely uncomfortable with movement.
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
## 2019-01-28 NOTE — ED NOTES
"Pt calling for ride home at this time, says that the medication is \"helping a lot\", pt was able to get a small amount of sleep once medication kicked in. Pt clinically sober and able to leave ED with ride home.   "
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
## 2019-01-28 NOTE — ED NOTES
Pt c/o severe pain at this time, tearful and basically sobbing. Heat pack and repositioning were provided earlier, but pt says that pain has not changed. Last norco was 1800 yesterday, MD to order analgesics at this time. Pt has not other requests or complaints.
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
## 2019-01-28 NOTE — ED NOTES
RN and pt remaining unsuccessful with finding pt a ride home. House supervisor was contacted by Shalini LARES as a last chance attempt to contact [employer]. Pt is not expecting a free ride home.

## 2019-01-28 NOTE — ED NOTES
Spoke with patient about lab results and need to stay in ED until it is safe for her to return home from a medical standpoint. Pt has no way of getting home at this time. Pt verbalizes understanding of this conversation.

## 2019-01-28 NOTE — ED NOTES
Bed: ED08  Expected date: 1/27/19  Expected time: 11:55 PM  Means of arrival: Ambulance  Comments:  Seferino Anand

## 2019-01-28 NOTE — ED NOTES
"Pt says  is coming and she needs to be at the front door or he will leave her here \"because he does not want her to come home.\" Walked pt to lobby where she is sitting waiting for  talking on the phone.   "

## 2019-05-02 DIAGNOSIS — L70.0 ACNE VULGARIS: ICD-10-CM

## 2019-05-02 RX ORDER — SPIRONOLACTONE 50 MG/1
50 TABLET, FILM COATED ORAL DAILY
Qty: 90 TABLET | Refills: 1 | Status: SHIPPED | OUTPATIENT
Start: 2019-05-02 | End: 2019-08-26

## 2019-05-02 NOTE — TELEPHONE ENCOUNTER
"Requested Prescriptions   Pending Prescriptions Disp Refills     spironolactone (ALDACTONE) 50 MG tablet 90 tablet 1     Sig: Take 1 tablet (50 mg) by mouth daily  Last Written Prescription Date:  11/26/18  Last Fill Quantity: 90,  # refills: 1   Last office visit: 11/14/2018 with prescribing provider:  Dhruv   Future Office Visit:           Diuretics (Including Combos) Protocol Failed - 5/2/2019  2:10 PM        Failed - Recent (12 mo) or future (30 days) visit within the authorizing provider's specialty     Patient had office visit in the last 12 months or has a visit in the next 30 days with authorizing provider or within the authorizing provider's specialty.  See \"Patient Info\" tab in inbasket, or \"Choose Columns\" in Meds & Orders section of the refill encounter.              Passed - Blood pressure under 140/90 in past 12 months     BP Readings from Last 3 Encounters:   01/28/19 129/85   01/20/19 (!) 149/100   01/19/19 (!) 138/95                 Passed - Medication is active on med list        Passed - Patient is age 18 or older        Passed - No active pregancy on record        Passed - Normal serum creatinine on file in past 12 months     Recent Labs   Lab Test 01/28/19  0042   CR 0.61              Passed - Normal serum potassium on file in past 12 months     Recent Labs   Lab Test 01/28/19  0042   POTASSIUM 4.1                    Passed - Normal serum sodium on file in past 12 months     Recent Labs   Lab Test 01/28/19  0042                 Passed - No positive pregnancy test in past 12 months        Routing refill request to provider for review/approval because:  BP over 140/90 in January 2019.  From LOV 11/14/18: Follow up in 3 month(s).     Zully AWAN RN  EP Triage          "

## 2019-08-26 DIAGNOSIS — L70.0 ACNE VULGARIS: ICD-10-CM

## 2019-08-26 NOTE — TELEPHONE ENCOUNTER
"Needs appointment for refills- info sent to patient  Savita Saldaña RN BSN  [facility]  [phone]    Last Written Prescription Date:  05/2/19  Last Fill Quantity: 90,  # refills: 1   Last office visit: 11/14/2018 with prescribing provider:     Future Office Visit:   Next 5 appointments (look out 90 days)    Oct 03, 2019 10:15 AM CDT  Return Visit with Edu Stanley MD  [facility] ([facility] [address] [place] [id number] [phone]           Requested Prescriptions   Pending Prescriptions Disp Refills     spironolactone (ALDACTONE) 50 MG tablet [Pharmacy Med Name: SPIRONOLACTONE 50MG TABLETS] 90 tablet 0     Sig: TAKE 1 TABLET(50 MG) BY MOUTH DAILY       Diuretics (Including Combos) Protocol Passed - 8/26/2019  5:10 AM        Passed - Blood pressure under 140/90 in past 12 months     BP Readings from Last 3 Encounters:   01/28/19 129/85   01/20/19 (!) 149/100   01/19/19 (!) 138/95                 Passed - Recent (12 mo) or future (30 days) visit within the authorizing provider's specialty     Patient had office visit in the last 12 months or has a visit in the next 30 days with authorizing provider or within the authorizing provider's specialty.  See \"Patient Info\" tab in inbasket, or \"Choose Columns\" in Meds & Orders section of the refill encounter.              Passed - Medication is active on med list        Passed - Patient is age 18 or older        Passed - No active pregancy on record        Passed - Normal serum creatinine on file in past 12 months     Recent Labs   Lab Test 01/28/19  0042   CR 0.61              Passed - Normal serum potassium on file in past 12 months     Recent Labs   Lab Test 01/28/19  0042   POTASSIUM 4.1                    Passed - Normal serum sodium on file in past 12 months     Recent Labs   Lab Test 01/28/19  0042                 Passed - No positive pregnancy test in past 12 " months

## 2019-08-27 RX ORDER — SPIRONOLACTONE 50 MG/1
TABLET, FILM COATED ORAL
Qty: 90 TABLET | Refills: 0 | Status: SHIPPED | OUTPATIENT
Start: 2019-08-27 | End: 2020-08-03

## 2019-08-27 NOTE — TELEPHONE ENCOUNTER
Called patient- she has appointment pending with Dr. Stanley and wants this refills until then.    Savita VELARN BSN  [facility]  [phone]

## 2019-09-27 ENCOUNTER — HEALTH MAINTENANCE LETTER (OUTPATIENT)
Age: 49
End: 2019-09-27

## 2020-01-08 ENCOUNTER — ALLIED HEALTH/NURSE VISIT (OUTPATIENT)
Dept: NURSING | Facility: CLINIC | Age: 50
End: 2020-01-08
Payer: COMMERCIAL

## 2020-01-08 DIAGNOSIS — Z23 NEED FOR PROPHYLACTIC VACCINATION AND INOCULATION AGAINST INFLUENZA: Primary | ICD-10-CM

## 2020-01-08 PROCEDURE — 90471 IMMUNIZATION ADMIN: CPT

## 2020-01-08 PROCEDURE — 90686 IIV4 VACC NO PRSV 0.5 ML IM: CPT

## 2020-08-03 ENCOUNTER — OFFICE VISIT (OUTPATIENT)
Dept: INTERNAL MEDICINE | Facility: CLINIC | Age: 50
End: 2020-08-03
Payer: COMMERCIAL

## 2020-08-03 VITALS
OXYGEN SATURATION: 99 % | RESPIRATION RATE: 16 BRPM | DIASTOLIC BLOOD PRESSURE: 90 MMHG | BODY MASS INDEX: 35.96 KG/M2 | WEIGHT: 203 LBS | SYSTOLIC BLOOD PRESSURE: 134 MMHG | HEART RATE: 89 BPM

## 2020-08-03 DIAGNOSIS — Z23 NEED FOR VACCINATION: ICD-10-CM

## 2020-08-03 DIAGNOSIS — Z76.89 ENCOUNTER TO ESTABLISH CARE: Primary | ICD-10-CM

## 2020-08-03 DIAGNOSIS — G56.03 BILATERAL CARPAL TUNNEL SYNDROME: ICD-10-CM

## 2020-08-03 DIAGNOSIS — E66.9 OBESITY (BMI 30-39.9): ICD-10-CM

## 2020-08-03 DIAGNOSIS — R03.0 ELEVATED BLOOD PRESSURE READING WITHOUT DIAGNOSIS OF HYPERTENSION: ICD-10-CM

## 2020-08-03 PROCEDURE — 90471 IMMUNIZATION ADMIN: CPT | Performed by: INTERNAL MEDICINE

## 2020-08-03 PROCEDURE — 99214 OFFICE O/P EST MOD 30 MIN: CPT | Mod: 25 | Performed by: INTERNAL MEDICINE

## 2020-08-03 PROCEDURE — 90714 TD VACC NO PRESV 7 YRS+ IM: CPT | Performed by: INTERNAL MEDICINE

## 2020-08-03 RX ORDER — FAMOTIDINE 20 MG
TABLET ORAL
COMMUNITY

## 2020-08-03 RX ORDER — LEVOTHYROXINE SODIUM 50 UG/1
50 TABLET ORAL
COMMUNITY
Start: 2019-01-30

## 2020-08-03 RX ORDER — ARIPIPRAZOLE 2 MG/1
TABLET ORAL
COMMUNITY
Start: 2020-07-20

## 2020-08-03 RX ORDER — ESCITALOPRAM OXALATE 20 MG/1
TABLET ORAL
COMMUNITY
Start: 2020-07-13

## 2020-08-03 RX ORDER — LORATADINE 10 MG/1
10 TABLET ORAL DAILY
COMMUNITY

## 2020-08-03 RX ORDER — SPIRONOLACTONE 100 MG/1
100 TABLET, FILM COATED ORAL DAILY
COMMUNITY
Start: 2020-08-03

## 2020-08-03 SDOH — HEALTH STABILITY: MENTAL HEALTH: HOW MANY STANDARD DRINKS CONTAINING ALCOHOL DO YOU HAVE ON A TYPICAL DAY?: 1 OR 2

## 2020-08-03 SDOH — HEALTH STABILITY: MENTAL HEALTH: HOW OFTEN DO YOU HAVE A DRINK CONTAINING ALCOHOL?: MONTHLY OR LESS

## 2020-08-03 SDOH — HEALTH STABILITY: MENTAL HEALTH: HOW OFTEN DO YOU HAVE 6 OR MORE DRINKS ON ONE OCCASION?: NEVER

## 2020-08-03 NOTE — PATIENT INSTRUCTIONS
TD given today.    You will be fitted for bilateral wrist splints - wear day and night for 6-8 weeks.     You will be contacted to schedule a physical therapy evaluation for your chronic ankle pain.     ---    Please follow-up in ~2 weeks for a blood pressure recheck. If still elevated, will recommend treatment and EKG.

## 2020-08-03 NOTE — PROGRESS NOTES
SUBJECTIVE                                                      HPI: Lorraine Peoples is a pleasant 49 year old female who presents to establish care:    Complains of bilateral hand, wrist, and forearm numbness and tingling. Indicates thumb and first two fingers of hand and medial wrist and forearm. Ongoing for months or longer. Discomfort is worse at night, often waking her up. No weakness or dropping episodes. No interventions to date.     Patient's blood pressure is also noted to be elevated today, even on repeat. Patient reports history of borderline-elevated blood pressure. No history of treatment. She is asymptomatic from a blood pressure perspective: no chest pain or palpitations, no shortness of breath, no light-headedness or dizziness, no headaches or vision changes.    Past Medical History:   Diagnosis Date     ADHD (attention deficit hyperactivity disorder)      JS (generalized anxiety disorder)      Hypothyroidism      Obesity (BMI 30-39.9)      PTSD (post-traumatic stress disorder)      Past Surgical History:   Procedure Laterality Date     ARTHROPLASTY TEMPOROMANDIBULAR JOINT (TMJ) Right 2019    multiple     HC HYSTEROS W PERMANENT FALLOPAIN IMPLANT  2007     HYSTERECTOMY VAGINAL  2019    bilateral salpingectomy, unilateral ophorectomy      HYSTEROSCOPY,ABLATION ENDOMETRIUM  2010     OPERATIVE HYSTEROSCOPY  2010     SINUS SURGERY  1999     TONSILLECTOMY & ADENOIDECTOMY       Family History   Problem Relation Age of Onset     Breast Cancer Mother         post-menopausal     Anxiety Disorder Mother      Alcoholism Mother      Attention Deficit Disorder Brother      Cerebrovascular Disease Maternal Grandmother      Ovarian Cancer Maternal Grandmother      Cerebrovascular Disease Maternal Grandfather      Diabetes Type 2  Paternal Grandmother      Cerebrovascular Disease Paternal Grandmother      Skin Cancer Paternal Grandmother      Cerebrovascular Disease Paternal Grandfather      Skin Cancer Paternal  Aunt      Myocardial Infarction No family hx of      Colon Cancer No family hx of      Social History     Occupational History     Occupation: Not working currently   Tobacco Use     Smoking status: Never Smoker     Smokeless tobacco: Never Used   Substance and Sexual Activity     Alcohol use: Yes     Frequency: Monthly or less     Drinks per session: 1 or 2     Binge frequency: Never     Drug use: No     Sexual activity: Yes     Partners: Male     Birth control/protection: Male Surgical   Social History Narrative    .    Daughter and son, 15 and 13 as of 2020.    Walks regularly.      Allergies   Allergen Reactions     Wellbutrin [Bupropion] Other (See Comments)     Suicidal thoughts     Minocycline Nausea     Penicillins Rash     Current Outpatient Medications   Medication Sig     amphetamine-dextroamphetamine (ADDERALL XR) 30 MG per 24 hr capsule Take 30 mg by mouth     ARIPiprazole (ABILIFY) 2 MG tablet      escitalopram (LEXAPRO) 20 MG tablet      levothyroxine (SYNTHROID/LEVOTHROID) 50 MCG tablet Take 50 mcg by mouth     loratadine (CLARITIN) 10 MG tablet Take 10 mg by mouth daily     NALTREXONE HCL PO Take 4.5 mg by mouth     spironolactone (ALDACTONE) 100 MG tablet Take 1 tablet (100 mg) by mouth daily     Vitamin D, Cholecalciferol, 25 MCG (1000 UT) CAPS      Immunization History   Administered Date(s) Administered     Influenza Vaccine IM > 6 months Valent IIV4 01/08/2020     TD (ADULT, 7+) 08/01/1994     TDAP Vaccine (Adacel) 06/17/2010     OBJECTIVE                                                      BP (!) 134/90   Pulse 89   Resp 16   Wt 92.1 kg (203 lb)   LMP  (LMP Unknown)   SpO2 99%   BMI 35.96 kg/m    Constitutional: well-appearing  Psych: normal judgment and insight; normal mood and affect; recent and remote memory intact; oriented to time, place, and person    PREVENTATIVE HEALTH                                                      Breast CA screening: up to date   Cervical CA  screening: n/a   Colon CA screening: not medically indicated at this time   Lung CA screening: n/a   Dexa: not medically indicated at this time   Screening HCV: n/a   Screening HIV: DUE  Screening cholesterol: up to date ([facility])  Screening diabetes: up to date ([facility])  STD testing: no risk factors present  Alcohol misuse screening: alcohol use reviewed - no intervention indicated at this time  Immunizations: reviewed; TD DUE    ASSESSMENT/PLAN                                                       (Z76.89) Encounter to establish care  (primary encounter diagnosis)  Comment: PMH, PSH, FH, SH, medications, allergies, immunizations, and preventative health measures reviewed.   Plan: see below for plans.     (Z23) Need for vaccination  Plan: TD given today.    (G56.03) Bilateral carpal tunnel syndrome  Plan: TRIAL of bilateral immobilizing wrist splints x 6-8 weeks - day and night.     (R03.0) Elevated blood pressure reading without diagnosis of hypertension  Plan: follow-up in 2 weeks; if blood pressure is still elevated, treatment and an EKG will be recommended.     The instructions on the AVS were discussed and explained to the patient. Patient expressed understanding of instructions.    A total of 25 minutes were spent face-to-face with this patient during this encounter and over half of that time was spent on counseling and coordination of care re: above diagnoses and plans of care.     (Chart documentation was completed, in part, with [employer] voice-recognition software. Even though reviewed, some grammatical, spelling, and word errors may remain.)    Skye Mustafa MD   [address] [address]  T: [phone], F: [phone]

## 2021-01-09 ENCOUNTER — HEALTH MAINTENANCE LETTER (OUTPATIENT)
Age: 51
End: 2021-01-09

## 2021-03-13 ENCOUNTER — HEALTH MAINTENANCE LETTER (OUTPATIENT)
Age: 51
End: 2021-03-13

## 2021-10-23 ENCOUNTER — HEALTH MAINTENANCE LETTER (OUTPATIENT)
Age: 51
End: 2021-10-23

## 2022-02-12 ENCOUNTER — HEALTH MAINTENANCE LETTER (OUTPATIENT)
Age: 52
End: 2022-02-12

## 2022-04-09 ENCOUNTER — HEALTH MAINTENANCE LETTER (OUTPATIENT)
Age: 52
End: 2022-04-09

## 2022-10-09 ENCOUNTER — HEALTH MAINTENANCE LETTER (OUTPATIENT)
Age: 52
End: 2022-10-09

## 2023-03-25 ENCOUNTER — HEALTH MAINTENANCE LETTER (OUTPATIENT)
Age: 53
End: 2023-03-25

## 2023-05-21 ENCOUNTER — HEALTH MAINTENANCE LETTER (OUTPATIENT)
Age: 53
End: 2023-05-21